# Patient Record
Sex: FEMALE | Race: WHITE | NOT HISPANIC OR LATINO | Employment: FULL TIME | URBAN - METROPOLITAN AREA
[De-identification: names, ages, dates, MRNs, and addresses within clinical notes are randomized per-mention and may not be internally consistent; named-entity substitution may affect disease eponyms.]

---

## 2017-01-12 ENCOUNTER — ROUTINE PRENATAL (OUTPATIENT)
Dept: OBSTETRICS AND GYNECOLOGY | Facility: CLINIC | Age: 37
End: 2017-01-12
Payer: COMMERCIAL

## 2017-01-12 VITALS
WEIGHT: 107.81 LBS | BODY MASS INDEX: 19.72 KG/M2 | SYSTOLIC BLOOD PRESSURE: 102 MMHG | DIASTOLIC BLOOD PRESSURE: 60 MMHG

## 2017-01-12 DIAGNOSIS — N91.2 AMENORRHEA: Primary | ICD-10-CM

## 2017-01-12 DIAGNOSIS — Z36.3 ENCOUNTER FOR ROUTINE SCREENING FOR MALFORMATION USING ULTRASONICS: ICD-10-CM

## 2017-01-12 PROCEDURE — 99999 PR PBB SHADOW E&M-EST. PATIENT-LVL I: CPT | Mod: PBBFAC,,, | Performed by: OBSTETRICS & GYNECOLOGY

## 2017-01-12 PROCEDURE — 1159F MED LIST DOCD IN RCRD: CPT | Mod: S$GLB,,, | Performed by: OBSTETRICS & GYNECOLOGY

## 2017-01-12 PROCEDURE — 99213 OFFICE O/P EST LOW 20 MIN: CPT | Mod: S$GLB,,, | Performed by: OBSTETRICS & GYNECOLOGY

## 2017-01-13 NOTE — PROGRESS NOTES
36 y.o.   OB History      Para Term  AB TAB SAB Ectopic Multiple Living    3 2 2       2        Comlaining of: pt here for amenorrhea visit, kelsey by early us ,   Good symptoms, no bleeding  Prev section x 2    PMH neg  PSH c section  Allergies none      ROS:  GENERAL: No fever, chills, fatigability or weight loss.  SKIN: No rashes, itching or changes in color or texture of skin.  HEAD: No headaches or recent head trauma.  EYES: Visual acuity fine. No photophobia, ocular pain or diplopia.  EARS: Denies ear pain, discharge or vertigo.  NOSE: No loss of smell, no epistaxis or postnasal drip.  MOUTH & THROAT: No hoarseness or change in voice. No excessive gum bleeding.  NODES: Denies swollen glands.  CHEST: Denies DASILVA, cyanosis, wheezing, cough and sputum production.  CARDIOVASCULAR: Denies chest pain, PND, orthopnea or reduced exercise tolerance.  ABDOMEN: Appetite fine. No weight loss. Denies diarrhea, abdominal pain, hematemesis or blood in stool.  URINARY: No flank pain, dysuria or hematuria.  PERIPHERAL VASCULAR: No claudication or cyanosis.  MUSCULOSKELETAL: No joint stiffness or swelling. Denies back pain.  NEUROLOGIC: No history of seizures, paralysis, alteration of gait or coordination      PE:   Visit Vitals    /60    Wt 48.9 kg (107 lb 12.9 oz)    LMP 10/10/2016 (Exact Date)    BMI 19.72 kg/m2    head/neck normal  abd soft, no organomegaly,   Fht good  extr normal    ass pregnancy/amenorrhea  Plan, counselled 15 min on prenatal care, diet, exercise, vit etc.  Fu visit 4 weeks for anatomy scan and labs

## 2017-01-19 ENCOUNTER — TELEPHONE (OUTPATIENT)
Dept: OBSTETRICS AND GYNECOLOGY | Facility: CLINIC | Age: 37
End: 2017-01-19

## 2017-01-19 NOTE — TELEPHONE ENCOUNTER
----- Message from Hilda Ivey sent at 1/19/2017 12:17 PM CST -----  Contact: self/[phone]  Patient is calling to find out the sex of her baby.  Please advise

## 2017-02-09 ENCOUNTER — LAB VISIT (OUTPATIENT)
Dept: LAB | Facility: HOSPITAL | Age: 37
End: 2017-02-09
Attending: OBSTETRICS & GYNECOLOGY
Payer: COMMERCIAL

## 2017-02-09 ENCOUNTER — ROUTINE PRENATAL (OUTPATIENT)
Dept: OBSTETRICS AND GYNECOLOGY | Facility: CLINIC | Age: 37
End: 2017-02-09
Payer: COMMERCIAL

## 2017-02-09 VITALS
SYSTOLIC BLOOD PRESSURE: 104 MMHG | WEIGHT: 111.13 LBS | DIASTOLIC BLOOD PRESSURE: 68 MMHG | BODY MASS INDEX: 20.32 KG/M2

## 2017-02-09 DIAGNOSIS — N91.2 AMENORRHEA: Primary | ICD-10-CM

## 2017-02-09 DIAGNOSIS — Z36.89 ULTRASOUND GENDER CHECK OF FETUS FOR MATERNAL WELL BEING: ICD-10-CM

## 2017-02-09 DIAGNOSIS — N91.2 AMENORRHEA: ICD-10-CM

## 2017-02-09 LAB
ABO + RH BLD: NORMAL
BASOPHILS # BLD AUTO: 0.05 K/UL
BASOPHILS NFR BLD: 0.4 %
BLD GP AB SCN CELLS X3 SERPL QL: NORMAL
DIFFERENTIAL METHOD: ABNORMAL
EOSINOPHIL # BLD AUTO: 0.1 K/UL
EOSINOPHIL NFR BLD: 1.1 %
ERYTHROCYTE [DISTWIDTH] IN BLOOD BY AUTOMATED COUNT: 14.3 %
HCT VFR BLD AUTO: 35.3 %
HGB BLD-MCNC: 12.2 G/DL
LYMPHOCYTES # BLD AUTO: 2.1 K/UL
LYMPHOCYTES NFR BLD: 17.1 %
MCH RBC QN AUTO: 31.7 PG
MCHC RBC AUTO-ENTMCNC: 34.6 %
MCV RBC AUTO: 92 FL
MONOCYTES # BLD AUTO: 0.8 K/UL
MONOCYTES NFR BLD: 6.8 %
NEUTROPHILS # BLD AUTO: 8.9 K/UL
NEUTROPHILS NFR BLD: 73 %
PLATELET # BLD AUTO: 227 K/UL
PMV BLD AUTO: 11.1 FL
RBC # BLD AUTO: 3.85 M/UL
WBC # BLD AUTO: 12.21 K/UL

## 2017-02-09 PROCEDURE — 86703 HIV-1/HIV-2 1 RESULT ANTBDY: CPT

## 2017-02-09 PROCEDURE — 0502F SUBSEQUENT PRENATAL CARE: CPT | Mod: S$GLB,,, | Performed by: OBSTETRICS & GYNECOLOGY

## 2017-02-09 PROCEDURE — 86762 RUBELLA ANTIBODY: CPT

## 2017-02-09 PROCEDURE — 86592 SYPHILIS TEST NON-TREP QUAL: CPT

## 2017-02-09 PROCEDURE — 86901 BLOOD TYPING SEROLOGIC RH(D): CPT

## 2017-02-09 PROCEDURE — 36415 COLL VENOUS BLD VENIPUNCTURE: CPT

## 2017-02-09 PROCEDURE — 85025 COMPLETE CBC W/AUTO DIFF WBC: CPT

## 2017-02-09 PROCEDURE — 99999 PR PBB SHADOW E&M-EST. PATIENT-LVL II: CPT | Mod: PBBFAC,,, | Performed by: OBSTETRICS & GYNECOLOGY

## 2017-02-09 PROCEDURE — 81220 CFTR GENE COM VARIANTS: CPT

## 2017-02-09 PROCEDURE — 86900 BLOOD TYPING SEROLOGIC ABO: CPT

## 2017-02-09 PROCEDURE — 87340 HEPATITIS B SURFACE AG IA: CPT

## 2017-02-10 LAB
HBV SURFACE AG SERPL QL IA: NEGATIVE
HIV 1+2 AB+HIV1 P24 AG SERPL QL IA: NEGATIVE
RPR SER QL: NORMAL
RUBV IGG SER-ACNC: 63.8 IU/ML
RUBV IGG SER-IMP: REACTIVE

## 2017-02-13 LAB — CFTR MUT ANL BLD/T: NORMAL

## 2017-03-09 ENCOUNTER — ROUTINE PRENATAL (OUTPATIENT)
Dept: OBSTETRICS AND GYNECOLOGY | Facility: CLINIC | Age: 37
End: 2017-03-09
Payer: COMMERCIAL

## 2017-03-09 ENCOUNTER — OFFICE VISIT (OUTPATIENT)
Dept: OBSTETRICS AND GYNECOLOGY | Facility: CLINIC | Age: 37
End: 2017-03-09
Payer: COMMERCIAL

## 2017-03-09 VITALS
DIASTOLIC BLOOD PRESSURE: 62 MMHG | WEIGHT: 117.94 LBS | BODY MASS INDEX: 21.57 KG/M2 | SYSTOLIC BLOOD PRESSURE: 108 MMHG

## 2017-03-09 DIAGNOSIS — N91.2 AMENORRHEA: Primary | ICD-10-CM

## 2017-03-09 DIAGNOSIS — Z36.89 ULTRASOUND GENDER CHECK OF FETUS FOR MATERNAL WELL BEING: ICD-10-CM

## 2017-03-09 DIAGNOSIS — Z36.3 ANTENATAL SCREENING FOR MALFORMATION USING ULTRASONICS: Primary | ICD-10-CM

## 2017-03-09 PROCEDURE — 99999 PR PBB SHADOW E&M-EST. PATIENT-LVL II: CPT | Mod: PBBFAC,,, | Performed by: OBSTETRICS & GYNECOLOGY

## 2017-03-09 PROCEDURE — 0502F SUBSEQUENT PRENATAL CARE: CPT | Mod: S$GLB,,, | Performed by: OBSTETRICS & GYNECOLOGY

## 2017-03-09 PROCEDURE — 76805 OB US >/= 14 WKS SNGL FETUS: CPT | Mod: S$GLB,,, | Performed by: OBSTETRICS & GYNECOLOGY

## 2017-04-06 ENCOUNTER — ROUTINE PRENATAL (OUTPATIENT)
Dept: OBSTETRICS AND GYNECOLOGY | Facility: CLINIC | Age: 37
End: 2017-04-06
Payer: COMMERCIAL

## 2017-04-06 VITALS
SYSTOLIC BLOOD PRESSURE: 100 MMHG | DIASTOLIC BLOOD PRESSURE: 66 MMHG | BODY MASS INDEX: 23.15 KG/M2 | WEIGHT: 126.56 LBS

## 2017-04-06 DIAGNOSIS — N91.2 AMENORRHEA: Primary | ICD-10-CM

## 2017-04-06 PROCEDURE — 0502F SUBSEQUENT PRENATAL CARE: CPT | Mod: S$GLB,,, | Performed by: OBSTETRICS & GYNECOLOGY

## 2017-04-06 PROCEDURE — 99999 PR PBB SHADOW E&M-EST. PATIENT-LVL I: CPT | Mod: PBBFAC,,, | Performed by: OBSTETRICS & GYNECOLOGY

## 2017-05-12 ENCOUNTER — ROUTINE PRENATAL (OUTPATIENT)
Dept: OBSTETRICS AND GYNECOLOGY | Facility: CLINIC | Age: 37
End: 2017-05-12
Payer: COMMERCIAL

## 2017-05-12 ENCOUNTER — LAB VISIT (OUTPATIENT)
Dept: LAB | Facility: HOSPITAL | Age: 37
End: 2017-05-12
Attending: OBSTETRICS & GYNECOLOGY
Payer: COMMERCIAL

## 2017-05-12 VITALS
DIASTOLIC BLOOD PRESSURE: 80 MMHG | BODY MASS INDEX: 24.31 KG/M2 | WEIGHT: 132.94 LBS | SYSTOLIC BLOOD PRESSURE: 116 MMHG

## 2017-05-12 DIAGNOSIS — N91.2 AMENORRHEA: Primary | ICD-10-CM

## 2017-05-12 DIAGNOSIS — N91.2 AMENORRHEA: ICD-10-CM

## 2017-05-12 LAB — GLUCOSE SERPL-MCNC: 135 MG/DL

## 2017-05-12 PROCEDURE — 82950 GLUCOSE TEST: CPT

## 2017-05-12 PROCEDURE — 0502F SUBSEQUENT PRENATAL CARE: CPT | Mod: S$GLB,,, | Performed by: OBSTETRICS & GYNECOLOGY

## 2017-05-12 PROCEDURE — 36415 COLL VENOUS BLD VENIPUNCTURE: CPT

## 2017-05-12 PROCEDURE — 99999 PR PBB SHADOW E&M-EST. PATIENT-LVL II: CPT | Mod: PBBFAC,,, | Performed by: OBSTETRICS & GYNECOLOGY

## 2017-05-23 ENCOUNTER — TELEPHONE (OUTPATIENT)
Dept: OBSTETRICS AND GYNECOLOGY | Facility: CLINIC | Age: 37
End: 2017-05-23

## 2017-05-23 NOTE — TELEPHONE ENCOUNTER
Patient was contacted and notified that her leave paperwork was ready for pickup and is located behind the  in a red folder. Verbalized understanding.

## 2017-06-01 ENCOUNTER — ROUTINE PRENATAL (OUTPATIENT)
Dept: OBSTETRICS AND GYNECOLOGY | Facility: CLINIC | Age: 37
End: 2017-06-01
Payer: COMMERCIAL

## 2017-06-01 DIAGNOSIS — Z34.90 PREGNANCY, UNSPECIFIED GESTATIONAL AGE: Primary | ICD-10-CM

## 2017-06-01 PROCEDURE — 0502F SUBSEQUENT PRENATAL CARE: CPT | Mod: S$GLB,,, | Performed by: OBSTETRICS & GYNECOLOGY

## 2017-06-16 ENCOUNTER — ROUTINE PRENATAL (OUTPATIENT)
Dept: OBSTETRICS AND GYNECOLOGY | Facility: CLINIC | Age: 37
End: 2017-06-16
Payer: COMMERCIAL

## 2017-06-16 ENCOUNTER — OFFICE VISIT (OUTPATIENT)
Dept: OBSTETRICS AND GYNECOLOGY | Facility: CLINIC | Age: 37
End: 2017-06-16
Payer: COMMERCIAL

## 2017-06-16 VITALS
SYSTOLIC BLOOD PRESSURE: 102 MMHG | DIASTOLIC BLOOD PRESSURE: 68 MMHG | BODY MASS INDEX: 25.32 KG/M2 | WEIGHT: 138.44 LBS

## 2017-06-16 DIAGNOSIS — Z34.90 PREGNANCY, UNSPECIFIED GESTATIONAL AGE: ICD-10-CM

## 2017-06-16 DIAGNOSIS — Z36.89 ENCOUNTER FOR ULTRASOUND TO CHECK FETAL GROWTH: Primary | ICD-10-CM

## 2017-06-16 DIAGNOSIS — N91.2 AMENORRHEA: Primary | ICD-10-CM

## 2017-06-16 DIAGNOSIS — Z36.85 ANTENATAL SCREENING FOR STREPTOCOCCUS B: ICD-10-CM

## 2017-06-16 PROCEDURE — 76816 OB US FOLLOW-UP PER FETUS: CPT | Mod: S$GLB,,, | Performed by: OBSTETRICS & GYNECOLOGY

## 2017-06-16 PROCEDURE — 99999 PR PBB SHADOW E&M-EST. PATIENT-LVL II: CPT | Mod: PBBFAC,,, | Performed by: OBSTETRICS & GYNECOLOGY

## 2017-06-16 PROCEDURE — 87081 CULTURE SCREEN ONLY: CPT

## 2017-06-16 PROCEDURE — 0502F SUBSEQUENT PRENATAL CARE: CPT | Mod: S$GLB,,, | Performed by: OBSTETRICS & GYNECOLOGY

## 2017-06-19 LAB — BACTERIA SPEC AEROBE CULT: NORMAL

## 2017-06-23 ENCOUNTER — ROUTINE PRENATAL (OUTPATIENT)
Dept: OBSTETRICS AND GYNECOLOGY | Facility: CLINIC | Age: 37
End: 2017-06-23
Payer: COMMERCIAL

## 2017-06-23 VITALS — SYSTOLIC BLOOD PRESSURE: 116 MMHG | WEIGHT: 137.81 LBS | DIASTOLIC BLOOD PRESSURE: 60 MMHG | BODY MASS INDEX: 25.2 KG/M2

## 2017-06-23 DIAGNOSIS — N91.2 AMENORRHEA: Primary | ICD-10-CM

## 2017-06-23 PROCEDURE — 99999 PR PBB SHADOW E&M-EST. PATIENT-LVL II: CPT | Mod: PBBFAC,,, | Performed by: OBSTETRICS & GYNECOLOGY

## 2017-06-23 PROCEDURE — 0502F SUBSEQUENT PRENATAL CARE: CPT | Mod: S$GLB,,, | Performed by: OBSTETRICS & GYNECOLOGY

## 2017-06-30 ENCOUNTER — ROUTINE PRENATAL (OUTPATIENT)
Dept: OBSTETRICS AND GYNECOLOGY | Facility: CLINIC | Age: 37
End: 2017-06-30
Payer: COMMERCIAL

## 2017-06-30 VITALS
DIASTOLIC BLOOD PRESSURE: 78 MMHG | SYSTOLIC BLOOD PRESSURE: 122 MMHG | BODY MASS INDEX: 25.48 KG/M2 | WEIGHT: 139.31 LBS

## 2017-06-30 DIAGNOSIS — N91.2 AMENORRHEA: Primary | ICD-10-CM

## 2017-06-30 PROCEDURE — 0502F SUBSEQUENT PRENATAL CARE: CPT | Mod: S$GLB,,, | Performed by: OBSTETRICS & GYNECOLOGY

## 2017-06-30 PROCEDURE — 99999 PR PBB SHADOW E&M-EST. PATIENT-LVL II: CPT | Mod: PBBFAC,,, | Performed by: OBSTETRICS & GYNECOLOGY

## 2017-06-30 NOTE — PROGRESS NOTES
Baby actvie, fht good, uterus soft  \bedsids us done, late 2 early 3 placenta, ama  Rpt section next thursday

## 2017-07-05 DIAGNOSIS — Z34.90 TERM PREGNANCY: ICD-10-CM

## 2017-07-05 RX ORDER — FAMOTIDINE 10 MG/ML
20 INJECTION INTRAVENOUS
Status: CANCELLED | OUTPATIENT
Start: 2017-07-05

## 2017-07-05 RX ORDER — SODIUM CITRATE AND CITRIC ACID MONOHYDRATE 334; 500 MG/5ML; MG/5ML
30 SOLUTION ORAL
Status: CANCELLED | OUTPATIENT
Start: 2017-07-05

## 2017-07-05 RX ORDER — SODIUM CHLORIDE, SODIUM LACTATE, POTASSIUM CHLORIDE, CALCIUM CHLORIDE 600; 310; 30; 20 MG/100ML; MG/100ML; MG/100ML; MG/100ML
INJECTION, SOLUTION INTRAVENOUS CONTINUOUS
Status: CANCELLED | OUTPATIENT
Start: 2017-07-05

## 2017-07-06 ENCOUNTER — SURGERY (OUTPATIENT)
Age: 37
End: 2017-07-06

## 2017-07-06 ENCOUNTER — ANESTHESIA EVENT (OUTPATIENT)
Dept: OBSTETRICS AND GYNECOLOGY | Facility: HOSPITAL | Age: 37
End: 2017-07-06
Payer: COMMERCIAL

## 2017-07-06 ENCOUNTER — ANESTHESIA (OUTPATIENT)
Dept: OBSTETRICS AND GYNECOLOGY | Facility: HOSPITAL | Age: 37
End: 2017-07-06
Payer: COMMERCIAL

## 2017-07-06 ENCOUNTER — HOSPITAL ENCOUNTER (INPATIENT)
Facility: HOSPITAL | Age: 37
LOS: 2 days | Discharge: HOME | End: 2017-07-08
Attending: OBSTETRICS & GYNECOLOGY | Admitting: OBSTETRICS & GYNECOLOGY
Payer: COMMERCIAL

## 2017-07-06 DIAGNOSIS — Z34.90 TERM PREGNANCY: ICD-10-CM

## 2017-07-06 LAB
ABO + RH BLD: NORMAL
BASOPHILS NFR BLD: 0 %
BLD GP AB SCN CELLS X3 SERPL QL: NORMAL
DIFFERENTIAL METHOD: NORMAL
EOSINOPHIL NFR BLD: 1 %
ERYTHROCYTE [DISTWIDTH] IN BLOOD BY AUTOMATED COUNT: 14.3 %
HCT VFR BLD AUTO: 37.1 %
HGB BLD-MCNC: 12.2 G/DL
LYMPHOCYTES NFR BLD: 35 %
MCH RBC QN AUTO: 30.1 PG
MCHC RBC AUTO-ENTMCNC: 32.9 %
MCV RBC AUTO: 92 FL
MONOCYTES NFR BLD: 6 %
NEUTROPHILS NFR BLD: 55 %
NEUTS BAND NFR BLD MANUAL: 3 %
PLATELET # BLD AUTO: 170 K/UL
PLATELET BLD QL SMEAR: NORMAL
PMV BLD AUTO: 11.7 FL
RBC # BLD AUTO: 4.05 M/UL
RPR SER QL: NORMAL
WBC # BLD AUTO: 9.56 K/UL

## 2017-07-06 PROCEDURE — 11000001 HC ACUTE MED/SURG PRIVATE ROOM

## 2017-07-06 PROCEDURE — 25000003 PHARM REV CODE 250: Performed by: ANESTHESIOLOGY

## 2017-07-06 PROCEDURE — 27201121 HC TRAY,SPINAL-HYPERBARIC: Performed by: ANESTHESIOLOGY

## 2017-07-06 PROCEDURE — 94761 N-INVAS EAR/PLS OXIMETRY MLT: CPT

## 2017-07-06 PROCEDURE — 86592 SYPHILIS TEST NON-TREP QUAL: CPT

## 2017-07-06 PROCEDURE — 36000685 HC CESAREAN SECTION LEVEL I

## 2017-07-06 PROCEDURE — 25000003 PHARM REV CODE 250: Performed by: STUDENT IN AN ORGANIZED HEALTH CARE EDUCATION/TRAINING PROGRAM

## 2017-07-06 PROCEDURE — 63600175 PHARM REV CODE 636 W HCPCS: Performed by: STUDENT IN AN ORGANIZED HEALTH CARE EDUCATION/TRAINING PROGRAM

## 2017-07-06 PROCEDURE — 25000003 PHARM REV CODE 250: Performed by: OBSTETRICS & GYNECOLOGY

## 2017-07-06 PROCEDURE — 85007 BL SMEAR W/DIFF WBC COUNT: CPT

## 2017-07-06 PROCEDURE — 37000008 HC ANESTHESIA 1ST 15 MINUTES: Performed by: OBSTETRICS & GYNECOLOGY

## 2017-07-06 PROCEDURE — 63600175 PHARM REV CODE 636 W HCPCS: Performed by: ANESTHESIOLOGY

## 2017-07-06 PROCEDURE — 59510 CESAREAN DELIVERY: CPT | Mod: AT,,, | Performed by: OBSTETRICS & GYNECOLOGY

## 2017-07-06 PROCEDURE — 85027 COMPLETE CBC AUTOMATED: CPT

## 2017-07-06 PROCEDURE — 86900 BLOOD TYPING SEROLOGIC ABO: CPT

## 2017-07-06 PROCEDURE — 86850 RBC ANTIBODY SCREEN: CPT

## 2017-07-06 PROCEDURE — 51702 INSERT TEMP BLADDER CATH: CPT

## 2017-07-06 PROCEDURE — 37000009 HC ANESTHESIA EA ADD 15 MINS: Performed by: OBSTETRICS & GYNECOLOGY

## 2017-07-06 RX ORDER — SODIUM CITRATE AND CITRIC ACID MONOHYDRATE 334; 500 MG/5ML; MG/5ML
30 SOLUTION ORAL
Status: DISCONTINUED | OUTPATIENT
Start: 2017-07-06 | End: 2017-07-08 | Stop reason: HOSPADM

## 2017-07-06 RX ORDER — DIPHENHYDRAMINE HYDROCHLORIDE 50 MG/ML
25 INJECTION INTRAMUSCULAR; INTRAVENOUS EVERY 4 HOURS PRN
Status: DISCONTINUED | OUTPATIENT
Start: 2017-07-06 | End: 2017-07-08 | Stop reason: HOSPADM

## 2017-07-06 RX ORDER — BUPIVACAINE HYDROCHLORIDE 2.5 MG/ML
30 INJECTION, SOLUTION EPIDURAL; INFILTRATION; INTRACAUDAL ONCE
Status: DISCONTINUED | OUTPATIENT
Start: 2017-07-06 | End: 2017-07-08 | Stop reason: HOSPADM

## 2017-07-06 RX ORDER — SODIUM CHLORIDE, SODIUM LACTATE, POTASSIUM CHLORIDE, CALCIUM CHLORIDE 600; 310; 30; 20 MG/100ML; MG/100ML; MG/100ML; MG/100ML
INJECTION, SOLUTION INTRAVENOUS CONTINUOUS
Status: ACTIVE | OUTPATIENT
Start: 2017-07-06 | End: 2017-07-06

## 2017-07-06 RX ORDER — FAMOTIDINE 10 MG/ML
20 INJECTION INTRAVENOUS
Status: DISCONTINUED | OUTPATIENT
Start: 2017-07-06 | End: 2017-07-08 | Stop reason: HOSPADM

## 2017-07-06 RX ORDER — OXYCODONE AND ACETAMINOPHEN 5; 325 MG/1; MG/1
1 TABLET ORAL EVERY 4 HOURS PRN
Status: DISCONTINUED | OUTPATIENT
Start: 2017-07-06 | End: 2017-07-08 | Stop reason: HOSPADM

## 2017-07-06 RX ORDER — DIPHENHYDRAMINE HCL 25 MG
25 CAPSULE ORAL EVERY 4 HOURS PRN
Status: DISCONTINUED | OUTPATIENT
Start: 2017-07-06 | End: 2017-07-08 | Stop reason: HOSPADM

## 2017-07-06 RX ORDER — ONDANSETRON 8 MG/1
8 TABLET, ORALLY DISINTEGRATING ORAL EVERY 8 HOURS PRN
Status: DISCONTINUED | OUTPATIENT
Start: 2017-07-06 | End: 2017-07-08 | Stop reason: HOSPADM

## 2017-07-06 RX ORDER — ONDANSETRON 2 MG/ML
4 INJECTION INTRAMUSCULAR; INTRAVENOUS EVERY 12 HOURS PRN
Status: DISCONTINUED | OUTPATIENT
Start: 2017-07-06 | End: 2017-07-08 | Stop reason: HOSPADM

## 2017-07-06 RX ORDER — ADHESIVE BANDAGE
30 BANDAGE TOPICAL 2 TIMES DAILY PRN
Status: DISCONTINUED | OUTPATIENT
Start: 2017-07-07 | End: 2017-07-08 | Stop reason: HOSPADM

## 2017-07-06 RX ORDER — PROPOFOL 10 MG/ML
VIAL (ML) INTRAVENOUS
Status: DISCONTINUED | OUTPATIENT
Start: 2017-07-06 | End: 2017-07-06

## 2017-07-06 RX ORDER — NAPROXEN 250 MG/1
375 TABLET ORAL 2 TIMES DAILY WITH MEALS
Status: DISCONTINUED | OUTPATIENT
Start: 2017-07-06 | End: 2017-07-06 | Stop reason: CLARIF

## 2017-07-06 RX ORDER — OXYTOCIN/RINGER'S LACTATE 20/1000 ML
41.65 PLASTIC BAG, INJECTION (ML) INTRAVENOUS CONTINUOUS
Status: ACTIVE | OUTPATIENT
Start: 2017-07-06 | End: 2017-07-06

## 2017-07-06 RX ORDER — NAPROXEN 500 MG/1
500 TABLET ORAL 2 TIMES DAILY PRN
Status: DISCONTINUED | OUTPATIENT
Start: 2017-07-06 | End: 2017-07-08 | Stop reason: HOSPADM

## 2017-07-06 RX ORDER — AMOXICILLIN 250 MG
1 CAPSULE ORAL NIGHTLY PRN
Status: DISCONTINUED | OUTPATIENT
Start: 2017-07-06 | End: 2017-07-08 | Stop reason: HOSPADM

## 2017-07-06 RX ORDER — SODIUM CHLORIDE, SODIUM LACTATE, POTASSIUM CHLORIDE, CALCIUM CHLORIDE 600; 310; 30; 20 MG/100ML; MG/100ML; MG/100ML; MG/100ML
INJECTION, SOLUTION INTRAVENOUS CONTINUOUS PRN
Status: DISCONTINUED | OUTPATIENT
Start: 2017-07-06 | End: 2017-07-06

## 2017-07-06 RX ORDER — MORPHINE SULFATE 2 MG/ML
2 INJECTION, SOLUTION INTRAMUSCULAR; INTRAVENOUS
Status: ACTIVE | OUTPATIENT
Start: 2017-07-06 | End: 2017-07-07

## 2017-07-06 RX ORDER — ACETAMINOPHEN 325 MG/1
650 TABLET ORAL EVERY 6 HOURS PRN
Status: DISCONTINUED | OUTPATIENT
Start: 2017-07-06 | End: 2017-07-08 | Stop reason: HOSPADM

## 2017-07-06 RX ORDER — BISACODYL 10 MG
10 SUPPOSITORY, RECTAL RECTAL ONCE AS NEEDED
Status: ACTIVE | OUTPATIENT
Start: 2017-07-06 | End: 2017-07-06

## 2017-07-06 RX ORDER — METOCLOPRAMIDE HYDROCHLORIDE 5 MG/ML
10 INJECTION INTRAMUSCULAR; INTRAVENOUS ONCE
Status: CANCELLED | OUTPATIENT
Start: 2017-07-06 | End: 2017-07-06

## 2017-07-06 RX ORDER — NALOXONE HCL 0.4 MG/ML
0.04 VIAL (ML) INJECTION
Status: DISCONTINUED | OUTPATIENT
Start: 2017-07-06 | End: 2017-07-08 | Stop reason: HOSPADM

## 2017-07-06 RX ORDER — MIDAZOLAM HYDROCHLORIDE 1 MG/ML
INJECTION INTRAMUSCULAR; INTRAVENOUS
Status: DISCONTINUED | OUTPATIENT
Start: 2017-07-06 | End: 2017-07-06

## 2017-07-06 RX ORDER — SIMETHICONE 80 MG
1 TABLET,CHEWABLE ORAL EVERY 6 HOURS PRN
Status: DISCONTINUED | OUTPATIENT
Start: 2017-07-06 | End: 2017-07-08 | Stop reason: HOSPADM

## 2017-07-06 RX ORDER — SODIUM CITRATE AND CITRIC ACID MONOHYDRATE 334; 500 MG/5ML; MG/5ML
30 SOLUTION ORAL ONCE
Status: CANCELLED | OUTPATIENT
Start: 2017-07-06 | End: 2017-07-06

## 2017-07-06 RX ORDER — OXYCODONE AND ACETAMINOPHEN 10; 325 MG/1; MG/1
1 TABLET ORAL EVERY 4 HOURS PRN
Status: DISCONTINUED | OUTPATIENT
Start: 2017-07-06 | End: 2017-07-08 | Stop reason: HOSPADM

## 2017-07-06 RX ORDER — FAMOTIDINE 10 MG/ML
20 INJECTION INTRAVENOUS ONCE
Status: CANCELLED | OUTPATIENT
Start: 2017-07-06 | End: 2017-07-06

## 2017-07-06 RX ORDER — SODIUM CHLORIDE, SODIUM LACTATE, POTASSIUM CHLORIDE, CALCIUM CHLORIDE 600; 310; 30; 20 MG/100ML; MG/100ML; MG/100ML; MG/100ML
INJECTION, SOLUTION INTRAVENOUS CONTINUOUS
Status: DISCONTINUED | OUTPATIENT
Start: 2017-07-06 | End: 2017-07-08 | Stop reason: HOSPADM

## 2017-07-06 RX ADMIN — MORPHINE SULFATE 0.2 MG: 1 INJECTION, SOLUTION EPIDURAL; INTRATHECAL; INTRAVENOUS at 08:07

## 2017-07-06 RX ADMIN — OXYCODONE HYDROCHLORIDE AND ACETAMINOPHEN 1 TABLET: 10; 325 TABLET ORAL at 08:07

## 2017-07-06 RX ADMIN — SODIUM CHLORIDE, SODIUM LACTATE, POTASSIUM CHLORIDE, AND CALCIUM CHLORIDE: .6; .31; .03; .02 INJECTION, SOLUTION INTRAVENOUS at 07:07

## 2017-07-06 RX ADMIN — PROPOFOL 30 MG: 10 INJECTION, EMULSION INTRAVENOUS at 08:07

## 2017-07-06 RX ADMIN — FAMOTIDINE 20 MG: 10 INJECTION, SOLUTION INTRAVENOUS at 06:07

## 2017-07-06 RX ADMIN — SODIUM CHLORIDE, SODIUM LACTATE, POTASSIUM CHLORIDE, AND CALCIUM CHLORIDE: .6; .31; .03; .02 INJECTION, SOLUTION INTRAVENOUS at 08:07

## 2017-07-06 RX ADMIN — NAPROXEN 500 MG: 500 TABLET ORAL at 08:07

## 2017-07-06 RX ADMIN — ONDANSETRON 4 MG: 2 INJECTION, SOLUTION INTRAMUSCULAR; INTRAVENOUS at 08:07

## 2017-07-06 RX ADMIN — PROPOFOL 20 MG: 10 INJECTION, EMULSION INTRAVENOUS at 08:07

## 2017-07-06 RX ADMIN — Medication 30 UNITS: at 09:07

## 2017-07-06 RX ADMIN — MIDAZOLAM HYDROCHLORIDE 2 MG: 1 INJECTION, SOLUTION INTRAMUSCULAR; INTRAVENOUS at 08:07

## 2017-07-06 RX ADMIN — DEXTROSE 2 G: 50 INJECTION, SOLUTION INTRAVENOUS at 08:07

## 2017-07-06 RX ADMIN — SODIUM CITRATE AND CITRIC ACID MONOHYDRATE 30 ML: 500; 334 SOLUTION ORAL at 06:07

## 2017-07-06 RX ADMIN — SODIUM CHLORIDE, SODIUM LACTATE, POTASSIUM CHLORIDE, AND CALCIUM CHLORIDE 1000 ML: .6; .31; .03; .02 INJECTION, SOLUTION INTRAVENOUS at 06:07

## 2017-07-06 RX ADMIN — FENTANYL CITRATE 10 MCG: 50 INJECTION, SOLUTION INTRAMUSCULAR; INTRAVENOUS at 08:07

## 2017-07-06 NOTE — ANESTHESIA PREPROCEDURE EVALUATION
07/06/2017  India Chopra is a 37 y.o., female.    Pre-op Assessment    I have reviewed the Patient Summary Reports.     I have reviewed the Nursing Notes.   I have reviewed the Medications.     Review of Systems  Anesthesia Hx:  No problems with previous Anesthesia States that her first spinal may have been high. Had SOB. Patient panicked. No intubation required History of prior surgery of interest to airway management or planning: Previous anesthesia: Spinal  Denies Personal Hx of Anesthesia complications.   Social:  Non-Smoker, No Alcohol Use    Cardiovascular:   Exercise tolerance: good    Pulmonary:  Pulmonary Normal    Renal/:  Renal/ Normal     Hepatic/GI:  Hepatic/GI Normal    Neurological:  Neurology Normal    Endocrine:  Endocrine Normal    Psych:   anxiety Anxiety/Panic Attacks: uses xanax during an attack         Physical Exam  General:  Well nourished    Airway/Jaw/Neck:  Airway Findings: Mouth Opening: Normal Tongue: Normal  General Airway Assessment: Adult  Mallampati: II  TM Distance: Normal, at least 6 cm      Dental:  Dental Findings: In tact   Chest/Lungs:  Chest/Lungs Findings: Clear to auscultation, Normal Respiratory Rate     Heart/Vascular:  Heart Findings: Rate: Normal  Sounds: Normal         labs pending; nml throughout pregnancy with no s/s of bleeding d/o  Lab Results   Component Value Date    WBC 9.56 07/06/2017    HGB 12.2 07/06/2017    HCT 37.1 07/06/2017     07/06/2017    TSH 1.09 12/18/2012         Anesthesia Plan  Type of Anesthesia, risks & benefits discussed:  Anesthesia Type:  spinal  Patient's Preference:   Intra-op Monitoring Plan:   Intra-op Monitoring Plan Comments:   Post Op Pain Control Plan:   Post Op Pain Control Plan Comments:   Induction:    Beta Blocker:         Informed Consent: Patient understands risks and agrees with Anesthesia plan.  Questions  answered. Anesthesia consent signed with patient.  ASA Score: 2     Day of Surgery Review of History & Physical: I have interviewed and examined the patient. I have reviewed the patient's H&P dated:            Ready For Surgery From Anesthesia Perspective.

## 2017-07-06 NOTE — PLAN OF CARE
Problem: Patient Care Overview  Goal: Plan of Care Review  Pt on RA SPO2 100%.  No apparent distress.  Will continue to monitor.

## 2017-07-06 NOTE — BRIEF OP NOTE
transcriptiion service not availabel at this time  Repeat low trans c section, clear fluid, healthy male, 2 grms ancef  No complictions, ebl 300cc,   mwiedemann July 6  rojas assissting  Counts correct

## 2017-07-07 LAB
BASOPHILS # BLD AUTO: 0.03 K/UL
BASOPHILS NFR BLD: 0.2 %
DIFFERENTIAL METHOD: ABNORMAL
EOSINOPHIL # BLD AUTO: 0.1 K/UL
EOSINOPHIL NFR BLD: 1.1 %
ERYTHROCYTE [DISTWIDTH] IN BLOOD BY AUTOMATED COUNT: 14.2 %
HCT VFR BLD AUTO: 28.2 %
HGB BLD-MCNC: 9.4 G/DL
LYMPHOCYTES # BLD AUTO: 1.5 K/UL
LYMPHOCYTES NFR BLD: 11.6 %
MCH RBC QN AUTO: 30.2 PG
MCHC RBC AUTO-ENTMCNC: 33.3 %
MCV RBC AUTO: 91 FL
MONOCYTES # BLD AUTO: 1.2 K/UL
MONOCYTES NFR BLD: 9.2 %
NEUTROPHILS # BLD AUTO: 10.1 K/UL
NEUTROPHILS NFR BLD: 77.3 %
PLATELET # BLD AUTO: 140 K/UL
PMV BLD AUTO: 12.2 FL
RBC # BLD AUTO: 3.11 M/UL
WBC # BLD AUTO: 13.07 K/UL

## 2017-07-07 PROCEDURE — 36415 COLL VENOUS BLD VENIPUNCTURE: CPT

## 2017-07-07 PROCEDURE — 85025 COMPLETE CBC W/AUTO DIFF WBC: CPT

## 2017-07-07 PROCEDURE — 99024 POSTOP FOLLOW-UP VISIT: CPT | Mod: ,,, | Performed by: OBSTETRICS & GYNECOLOGY

## 2017-07-07 PROCEDURE — 11000001 HC ACUTE MED/SURG PRIVATE ROOM

## 2017-07-07 PROCEDURE — 25000003 PHARM REV CODE 250: Performed by: OBSTETRICS & GYNECOLOGY

## 2017-07-07 RX ORDER — FENTANYL CITRATE 50 UG/ML
INJECTION, SOLUTION INTRAMUSCULAR; INTRAVENOUS
Status: DISCONTINUED | OUTPATIENT
Start: 2017-07-06 | End: 2017-07-07

## 2017-07-07 RX ORDER — OXYTOCIN/RINGER'S LACTATE 20/1000 ML
PLASTIC BAG, INJECTION (ML) INTRAVENOUS
Status: DISCONTINUED | OUTPATIENT
Start: 2017-07-06 | End: 2017-07-07

## 2017-07-07 RX ORDER — MORPHINE SULFATE 1 MG/ML
INJECTION, SOLUTION EPIDURAL; INTRATHECAL; INTRAVENOUS
Status: DISCONTINUED | OUTPATIENT
Start: 2017-07-06 | End: 2017-07-07

## 2017-07-07 RX ADMIN — ACETAMINOPHEN 650 MG: 325 TABLET ORAL at 06:07

## 2017-07-07 RX ADMIN — OXYCODONE AND ACETAMINOPHEN 1 TABLET: 5; 325 TABLET ORAL at 10:07

## 2017-07-07 RX ADMIN — SIMETHICONE CHEW TAB 80 MG 80 MG: 80 TABLET ORAL at 10:07

## 2017-07-07 RX ADMIN — OXYCODONE HYDROCHLORIDE AND ACETAMINOPHEN 1 TABLET: 10; 325 TABLET ORAL at 06:07

## 2017-07-07 RX ADMIN — NAPROXEN 500 MG: 500 TABLET ORAL at 10:07

## 2017-07-07 RX ADMIN — MAGNESIUM HYDROXIDE 2400 MG: 400 SUSPENSION ORAL at 04:07

## 2017-07-07 RX ADMIN — OXYCODONE AND ACETAMINOPHEN 1 TABLET: 5; 325 TABLET ORAL at 07:07

## 2017-07-07 RX ADMIN — STANDARDIZED SENNA CONCENTRATE AND DOCUSATE SODIUM 1 TABLET: 8.6; 5 TABLET, FILM COATED ORAL at 10:07

## 2017-07-07 RX ADMIN — SIMETHICONE CHEW TAB 80 MG 80 MG: 80 TABLET ORAL at 04:07

## 2017-07-07 NOTE — ANESTHESIA PROCEDURE NOTES
Spinal    Diagnosis: Pregnancy,   Patient location during procedure: OR  Start time: 2017 8:21 AM  Timeout: 2017 8:20 AM  End time: 2017 8:30 AM  Staffing  Anesthesiologist: PREET FOSTER  Resident/CRNA: CECILIO VIRGEN  Performed: resident/CRNA   Preanesthetic Checklist  Completed: patient identified, site marked, surgical consent, pre-op evaluation, timeout performed, IV checked, risks and benefits discussed and monitors and equipment checked  Spinal Block  Patient position: sitting  Prep: ChloraPrep  Patient monitoring: heart rate, cardiac monitor and continuous pulse ox  Approach: midline  Location: L3-4  Injection technique: single shot  CSF Fluid: clear free-flowing CSF  Needle  Needle type: pencil-tip   Needle gauge: 25 G  Needle length: 3.5 in  Additional Documentation: negative aspiration for heme and no paresthesia on injection  Needle localization: anatomical landmarks  Assessment  Sensory level: T6   Dermatomal levels determined by alcohol wipe  Ease of block: easy  Patient's tolerance of the procedure: comfortable throughout block  Medications:  Bolus administered: 1.4 mL of 0.75 and with dextrose bupivacaine  Opioid administered: 0.2 mcg of   fentanyl and morphine (10 mcg)  Additional Notes  Performed aseptically by LINDSEY Virgen

## 2017-07-07 NOTE — ANESTHESIA POSTPROCEDURE EVALUATION
"Anesthesia Post Evaluation    Patient: India Chopra    Procedure(s) Performed: Procedure(s) (LRB):  DELIVERY-CEASAREAN SECTION (N/A)    Final Anesthesia Type: spinal  Patient location during evaluation: labor & delivery  Patient participation: Yes- Able to Participate  Level of consciousness: awake and alert and oriented  Post-procedure vital signs: reviewed and stable  Pain management: adequate  Airway patency: patent  PONV status at discharge: No PONV  Anesthetic complications: no      Cardiovascular status: blood pressure returned to baseline and hemodynamically stable  Respiratory status: unassisted, spontaneous ventilation and room air  Hydration status: euvolemic  Follow-up not needed.        Visit Vitals  /61 (BP Location: Right arm, Patient Position: Sitting, BP Method: Automatic)   Pulse 84   Temp 36.5 °C (97.7 °F) (Oral)   Resp 18   Ht 5' 2" (1.575 m)   Wt 63 kg (139 lb)   LMP 10/10/2016 (Exact Date)   SpO2 100%   Breastfeeding? Unknown   BMI 25.42 kg/m²       Pain/Ruiz Score: Pain Rating Prior to Med Admin: 5 (7/7/2017 10:11 AM)  Pain Rating Post Med Admin: 0 (7/7/2017  8:00 AM)    No catheter in back  No headache/neckache/backache  Full return of neurological function  Able to urinate  "

## 2017-07-07 NOTE — PROGRESS NOTES
"POD #1 s/p     Subjective: No complaints. Positive flatus    Objective: /61 (BP Location: Right arm, Patient Position: Sitting, BP Method: Automatic)   Pulse 84   Temp 97.7 °F (36.5 °C) (Oral)   Resp 18   Ht 5' 2" (1.575 m)   Wt 63 kg (139 lb)   LMP 10/10/2016 (Exact Date)   SpO2 100%   Breastfeeding? Unknown   BMI 25.42 kg/m²     H/H:   Lab Results   Component Value Date    WBC 13.07 (H) 2017    HGB 9.4 (L) 2017    HCT 28.2 (L) 2017    MCV 91 2017     (L) 2017       Chest: Clear to auscultation  CV: Regular rate and rhythm  Abdomen: Non-tender, non-distended, soft, positive bowel sounds  Incision: Bandaged  Extremities: Non-tender, no edema    Assessment: S/P     Plan: Routine progressive care      "

## 2017-07-08 VITALS
OXYGEN SATURATION: 99 % | RESPIRATION RATE: 18 BRPM | HEART RATE: 87 BPM | TEMPERATURE: 98 F | SYSTOLIC BLOOD PRESSURE: 92 MMHG | BODY MASS INDEX: 25.58 KG/M2 | WEIGHT: 139 LBS | HEIGHT: 62 IN | DIASTOLIC BLOOD PRESSURE: 54 MMHG

## 2017-07-08 PROCEDURE — 90471 IMMUNIZATION ADMIN: CPT | Performed by: OBSTETRICS & GYNECOLOGY

## 2017-07-08 PROCEDURE — 94761 N-INVAS EAR/PLS OXIMETRY MLT: CPT

## 2017-07-08 PROCEDURE — 25000003 PHARM REV CODE 250: Performed by: OBSTETRICS & GYNECOLOGY

## 2017-07-08 PROCEDURE — 63600175 PHARM REV CODE 636 W HCPCS: Performed by: OBSTETRICS & GYNECOLOGY

## 2017-07-08 PROCEDURE — 99024 POSTOP FOLLOW-UP VISIT: CPT | Mod: ,,, | Performed by: OBSTETRICS & GYNECOLOGY

## 2017-07-08 PROCEDURE — 3E0234Z INTRODUCTION OF SERUM, TOXOID AND VACCINE INTO MUSCLE, PERCUTANEOUS APPROACH: ICD-10-PCS | Performed by: OBSTETRICS & GYNECOLOGY

## 2017-07-08 PROCEDURE — 90715 TDAP VACCINE 7 YRS/> IM: CPT | Performed by: OBSTETRICS & GYNECOLOGY

## 2017-07-08 RX ADMIN — CLOSTRIDIUM TETANI TOXOID ANTIGEN (FORMALDEHYDE INACTIVATED), CORYNEBACTERIUM DIPHTHERIAE TOXOID ANTIGEN (FORMALDEHYDE INACTIVATED), BORDETELLA PERTUSSIS TOXOID ANTIGEN (GLUTARALDEHYDE INACTIVATED), BORDETELLA PERTUSSIS FILAMENTOUS HEMAGGLUTININ ANTIGEN (FORMALDEHYDE INACTIVATED), BORDETELLA PERTUSSIS PERTACTIN ANTIGEN, AND BORDETELLA PERTUSSIS FIMBRIAE 2/3 ANTIGEN 0.5 ML: 5; 2; 2.5; 5; 3; 5 INJECTION, SUSPENSION INTRAMUSCULAR at 12:07

## 2017-07-08 RX ADMIN — NAPROXEN 500 MG: 500 TABLET ORAL at 05:07

## 2017-07-08 NOTE — DISCHARGE SUMMARY
Admit Date: 17  Discharge Date: 17    Attending physician: Wiedemann , MD    Diagnosis:  Term Pregnancy  Viable male  Infant  Previous   AMA    Procedure:   Repeat       Hospital Course: Afebrile and vital signs stable throughout hospital course. Routine progressive care. Vaginal bleeding stable. Tolerating oral intake. Positive flatus.    Discharged Condition: Improving    Disposition: Discharged to home or self care    Activity:  As tolerated  Pelvic rest x 6 weeks  Diet: Regular  Medications: Given to patient or sent electronically   Follow up:  2  Weeks for   4-6 weeks for vaginal delivery

## 2017-07-08 NOTE — PLAN OF CARE
Discharge instructions given verbally and in writing.  Verbalized understanding.  Received Mother-Baby care guide during hospital stay and reviewed with pt prior to discharge.  Prescriptions given with explanation for use by in house pharmacy. Verbalized understanding.  CDC vaccine information statement given and risk/benifits of tdap vaccine discussed.  Tdap given per pt. request. States she feels comfortable taking care of baby and has demonstrated ability to care for  and herself.  Both parents demonstrated ability to care for circumcision site.  Says she will have assistance from  and father when she returns home.  Discharged to home in stable condition via w/c with infant in arms.

## 2017-07-08 NOTE — DISCHARGE INSTRUCTIONS
"Patient Discharge Instructions for Postpartum Women    Resume Regular Diet  Increase activity gradually, no heavy lifting  Shower  No tampons, douching or sexual intercourse.  Discuss birth control options with your physician.  Wear a support bra  Return to work/school when you've been cleared by a physician    Call your physician if     *Fever of 100.4 or higher  *Persistent nausea/ vomiting  *Incisional drainage  *Heavy vaginal bleeding or large clots (Heavy bleeding is soaking 1 pad in an hour)  *Swelling and pain in arms or legs  *Severe headaches, blurred vision or fainting  *Shortness of breath  *Frequency and burning with urination  *Signs of postpartum depression, discuss these signs with your physician    Call lactation services for questions regarding feeding, nipple and breast care, and general questions about lactation.  They can be reached at [phone]         Understanding Postpartum Depression    You've just had a baby.  You know you should be excited and happy.  But instead you find yourself crying for no reason.  You may have trouble coping with your daily tasks.  You feel sad, tired, and hopeless most of the time.  You may even feel ashamed or guilty.  But what you're going through is not your fault and you can feel better.  Talk to your doctor.  He or she can help.    Depression After Childbirth    You may be weepy and tired right after giving birth.  These feelings are normal.  They're sometimes called the "baby blues."  These blues go away 2-3 weeks.  However, postpartum (meaning "after birth") depression lasts much longer and is more sever than the "baby blues."  It can make you feel sad and hopeless.  You may also fear that your baby will be harmed and worry about being a bad mother.      What is Depression?    Depression is a mood disorder that affects the way you think and feel.  The most common symptom is a feeling of deep sadness.  You may also feel as if you just can't cope with life.  "   Other symptoms include:      * Gaining or loosing weight  * Sleeping too much or too little  * Feeling tired all the time  * Feeling restless  * Fears of harming your baby   * Lack of interest in your baby  * Feeling worthless or guilty  * No longer finding pleasure in things you used to  * Having trouble thinking clearly or making decisions  * Thoughts of hurting yourself or your baby    What Causes Postpartum Depression    The exact causes of postpartum depression isn't known.  It may be due to changes in your hormones during and after childbirth.  You may also be tired from caring for your baby and adjusting to being a mother.  All these factors may make you feel depressed.  In some cases, your genes may also play a role.    Depression Can Be Treated    The good news is that there are many ways to treat postpartum depression.  Talking to your doctor is the first step toward feeling better.    Resources:    * National [place] of Mental Health  -- 963.661.4551    www.nimh.nih.gov    * National [place] on Mental Illness --376.237.6565    Www.[place].org    * Mental Health [place] -- 703.973.7714     Www.[facility].org    * National Suicide Hotline --316.558.7111 (800-SUICIDE)    1744-6797 [employer]  All rights reserved.  This information is not intended as a substitute for professional medical care.  Always follow up with your healthcare professional's instructions.

## 2017-07-08 NOTE — NURSING
17 @ 1900 - Rec'd report off-going nurse on 38 y/o CF  S/P Repeat  17 @ 0840 M/C 7 lbs 11.3 oz.  2200 - Percocet 5 mg PO and Docusate 1 tablet PO.   0445 - C/O abdominal incision burning.   0533 - Naproxen 500 mg PO.   0645 - Report given to oncoming nurse.

## 2017-07-08 NOTE — PLAN OF CARE
Problem: Patient Care Overview  Goal: Plan of Care Review  Ambulating, showered, dressing removed.  Incision dry and intact.  Voiding and passing gas.  Reg diet tolerated.  Has chosen to formula feed and demonstrates ability to feed and care for baby.  Considering going home tomorrow.  States pain is well controled with pain medication.  Request Milk of magnesia for feeling of constipation and simethicone for gas.  States simethicone helpful.  Encouraged fluids and ambulation.  Verbalized understanding.

## 2017-07-09 NOTE — OP NOTE
Date July 6  Procedure rpt low trans section  Surgeon rebecca xie  Dx term pregnancy, prev section  Complications none  Procedure, spinal, prep, iv ab's, pfan inc through old scare, open in layers routine, clear fluid, healthy infant  Transverse inc, placenta in tact, uterus and abd closed in layers routine, ebl 400, urine clear, counts correct

## 2017-07-10 ENCOUNTER — TELEPHONE (OUTPATIENT)
Dept: OBSTETRICS AND GYNECOLOGY | Facility: CLINIC | Age: 37
End: 2017-07-10

## 2017-07-10 NOTE — TELEPHONE ENCOUNTER
----- Message from Dali Meyers sent at 7/10/2017  8:11 AM CDT -----  Contact: [phone]  Patient would like to know when she has to come in to have her staples removed

## 2017-07-13 ENCOUNTER — POSTPARTUM VISIT (OUTPATIENT)
Dept: OBSTETRICS AND GYNECOLOGY | Facility: CLINIC | Age: 37
End: 2017-07-13
Payer: COMMERCIAL

## 2017-07-13 VITALS
SYSTOLIC BLOOD PRESSURE: 110 MMHG | WEIGHT: 126.13 LBS | DIASTOLIC BLOOD PRESSURE: 68 MMHG | HEIGHT: 62 IN | BODY MASS INDEX: 23.21 KG/M2

## 2017-07-13 DIAGNOSIS — Z98.890 POST-OPERATIVE STATE: Primary | ICD-10-CM

## 2017-07-13 PROCEDURE — 99024 POSTOP FOLLOW-UP VISIT: CPT | Mod: S$GLB,,, | Performed by: OBSTETRICS & GYNECOLOGY

## 2017-07-13 PROCEDURE — 99999 PR PBB SHADOW E&M-EST. PATIENT-LVL II: CPT | Mod: PBBFAC,,, | Performed by: OBSTETRICS & GYNECOLOGY

## 2017-07-13 RX ORDER — OXYCODONE AND ACETAMINOPHEN 5; 325 MG/1; MG/1
TABLET ORAL
COMMUNITY
Start: 2017-07-06

## 2017-07-13 RX ORDER — NAPROXEN SODIUM 550 MG/1
TABLET ORAL
COMMUNITY
Start: 2017-07-06

## 2017-07-14 NOTE — PROGRESS NOTES
Post c secton xenia gutierrez  Staples removed  headling great  No co  Gi and gu good  [facility]  Bottle

## 2017-07-19 NOTE — TELEPHONE ENCOUNTER
----- Message from Tiff Love sent at 7/19/2017  2:47 PM CDT -----  No. [phone]  Patient had a section 2 weeks ago.  She has a yeast infection.   [facility] in [place]

## 2017-07-20 RX ORDER — FLUCONAZOLE 150 MG/1
150 TABLET ORAL DAILY
Qty: 1 TABLET | Refills: 1 | Status: SHIPPED | OUTPATIENT
Start: 2017-07-20 | End: 2017-07-21

## 2017-07-21 NOTE — ADDENDUM NOTE
Addendum  created 07/20/17 2140 by Man Virgen MD    Anesthesia Intra Blocks edited, Sign clinical note

## 2020-07-31 ENCOUNTER — OFFICE VISIT (OUTPATIENT)
Dept: OBSTETRICS AND GYNECOLOGY | Facility: CLINIC | Age: 40
End: 2020-07-31
Payer: COMMERCIAL

## 2020-07-31 VITALS
DIASTOLIC BLOOD PRESSURE: 76 MMHG | HEIGHT: 62 IN | SYSTOLIC BLOOD PRESSURE: 124 MMHG | WEIGHT: 113.88 LBS | BODY MASS INDEX: 20.96 KG/M2

## 2020-07-31 DIAGNOSIS — Z01.419 WELL WOMAN EXAM WITH ROUTINE GYNECOLOGICAL EXAM: ICD-10-CM

## 2020-07-31 DIAGNOSIS — Z12.4 ROUTINE PAPANICOLAOU SMEAR: Primary | ICD-10-CM

## 2020-07-31 PROCEDURE — 99385 PR PREVENTIVE VISIT,NEW,18-39: ICD-10-PCS | Mod: S$GLB,,, | Performed by: OBSTETRICS & GYNECOLOGY

## 2020-07-31 PROCEDURE — 99385 PREV VISIT NEW AGE 18-39: CPT | Mod: S$GLB,,, | Performed by: OBSTETRICS & GYNECOLOGY

## 2020-07-31 PROCEDURE — 88175 CYTOPATH C/V AUTO FLUID REDO: CPT

## 2020-07-31 PROCEDURE — 99999 PR PBB SHADOW E&M-NEW PATIENT-LVL III: ICD-10-PCS | Mod: PBBFAC,,, | Performed by: OBSTETRICS & GYNECOLOGY

## 2020-07-31 PROCEDURE — 99999 PR PBB SHADOW E&M-NEW PATIENT-LVL III: CPT | Mod: PBBFAC,,, | Performed by: OBSTETRICS & GYNECOLOGY

## 2020-07-31 RX ORDER — NORETHINDRONE ACETATE AND ETHINYL ESTRADIOL .02; 1 MG/1; MG/1
1 TABLET ORAL DAILY
Qty: 30 TABLET | Refills: 11 | Status: SHIPPED | OUTPATIENT
Start: 2020-07-31 | End: 2021-07-31

## 2020-07-31 NOTE — PROGRESS NOTES
"HPI:   40 y.o.   OB History        3    Para   3    Term   3            AB        Living   3       SAB        TAB        Ectopic        Multiple   0    Live Births   3              Patient's last menstrual period was 2020 (within days).    Patient is  here for her annual gynecologic exam.  She has no complaints.     ROS:  GENERAL: No fever, chills, fatigability or weight loss.  SKIN: No rashes, itching or changes in color or texture of skin.  HEAD: No headaches or recent head trauma.  EYES: Visual acuity fine. No photophobia, ocular pain or diplopia.  EARS: Denies ear pain, discharge or vertigo.  NOSE: No loss of smell, no epistaxis or postnasal drip.  MOUTH & THROAT: No hoarseness or change in voice. No excessive gum bleeding.  NODES: Denies swollen glands.  CHEST: Denies DASILVA, cyanosis, wheezing, cough and sputum production.  CARDIOVASCULAR: Denies chest pain, PND, orthopnea or reduced exercise tolerance.  ABDOMEN: Appetite fine. No weight loss. Denies diarrhea, abdominal pain, hematemesis or blood in stool.  URINARY: No flank pain, dysuria or hematuria.  PERIPHERAL VASCULAR: No claudication or cyanosis.  MUSCULOSKELETAL: No joint stiffness or swelling. Denies back pain.  NEUROLOGIC: No history of seizures, paralysis, alteration of gait or coordination.    PE:   /76   Ht 5' 2" (1.575 m)   Wt 51.6 kg (113 lb 13.9 oz)   LMP 2020 (Within Days)   BMI 20.83 kg/m²   APPEARANCE: Well nourished, well developed, in no acute distress.  NECK: Neck symmetric without masses or thyromegaly.  BREASTS: Symmetrical, no skin changes or visible lesions. No palpable masses, nipple discharge or adenopathy bilaterally.  ABDOMEN: Flat. Soft. No tenderness or masses. No hepatosplenomegaly. No hernias. No CVA tenderness.  VULVA: No lesions. Normal female genitalia.  URETHRAL MEATUS: Normal size and location, no lesions, no prolapse.  URETHRA: No masses, tenderness, prolapse or scarring.  VAGINA: Moist " and well rugated, no discharge, no significant cystocele or rectocele.  CERVIX: No lesions and discharge. PAP done.  UTERUS: Normal size, regular shape, mobile, non-tender, bladder base nontender.  ADNEXA: No masses, tenderness or CDS nodularity.  ANUS PERINEUM: Normal.    PROCEDURES:  Pap smear    Assessment:  Normal Gynecologic Exam    Plan:  Mammogram and Colonoscopy if indicated by current recommendations.  Return to clinic in one year or for any problems or complaints.  Ov today  Wasn't bc

## 2020-08-17 LAB
FINAL PATHOLOGIC DIAGNOSIS: NORMAL
Lab: NORMAL

## 2021-02-26 RX ORDER — FLUCONAZOLE 150 MG/1
150 TABLET ORAL DAILY
Qty: 1 TABLET | Refills: 0 | Status: SHIPPED | OUTPATIENT
Start: 2021-02-26 | End: 2021-02-27

## 2021-03-12 ENCOUNTER — IMMUNIZATION (OUTPATIENT)
Dept: PRIMARY CARE CLINIC | Facility: CLINIC | Age: 41
End: 2021-03-12

## 2021-03-12 DIAGNOSIS — Z23 NEED FOR VACCINATION: Primary | ICD-10-CM

## 2021-03-12 PROCEDURE — 0001A PR IMMUNIZ ADMIN, SARS-COV-2 COVID-19 VACC, 30MCG/0.3ML, 1ST DOSE: ICD-10-PCS | Mod: CV19,S$GLB,, | Performed by: INTERNAL MEDICINE

## 2021-03-12 PROCEDURE — 91300 PR SARS-COV- 2 COVID-19 VACCINE, NO PRSV, 30MCG/0.3ML, IM: ICD-10-PCS | Mod: S$GLB,,, | Performed by: INTERNAL MEDICINE

## 2021-03-12 PROCEDURE — 0001A PR IMMUNIZ ADMIN, SARS-COV-2 COVID-19 VACC, 30MCG/0.3ML, 1ST DOSE: CPT | Mod: CV19,S$GLB,, | Performed by: INTERNAL MEDICINE

## 2021-03-12 PROCEDURE — 91300 PR SARS-COV- 2 COVID-19 VACCINE, NO PRSV, 30MCG/0.3ML, IM: CPT | Mod: S$GLB,,, | Performed by: INTERNAL MEDICINE

## 2021-03-12 RX ADMIN — Medication 0.3 ML: at 04:03

## 2021-04-02 ENCOUNTER — IMMUNIZATION (OUTPATIENT)
Dept: PRIMARY CARE CLINIC | Facility: CLINIC | Age: 41
End: 2021-04-02
Payer: COMMERCIAL

## 2021-04-02 DIAGNOSIS — Z23 NEED FOR VACCINATION: Primary | ICD-10-CM

## 2021-04-02 PROCEDURE — 0002A PR IMMUNIZ ADMIN, SARS-COV-2 COVID-19 VACC, 30MCG/0.3ML, 2ND DOSE: CPT | Mod: CV19,S$GLB,, | Performed by: INTERNAL MEDICINE

## 2021-04-02 PROCEDURE — 91300 PR SARS-COV- 2 COVID-19 VACCINE, NO PRSV, 30MCG/0.3ML, IM: CPT | Mod: S$GLB,,, | Performed by: INTERNAL MEDICINE

## 2021-04-02 PROCEDURE — 91300 PR SARS-COV- 2 COVID-19 VACCINE, NO PRSV, 30MCG/0.3ML, IM: ICD-10-PCS | Mod: S$GLB,,, | Performed by: INTERNAL MEDICINE

## 2021-04-02 PROCEDURE — 0002A PR IMMUNIZ ADMIN, SARS-COV-2 COVID-19 VACC, 30MCG/0.3ML, 2ND DOSE: ICD-10-PCS | Mod: CV19,S$GLB,, | Performed by: INTERNAL MEDICINE

## 2021-04-02 RX ADMIN — Medication 0.3 ML: at 04:04

## 2021-04-19 ENCOUNTER — TELEPHONE (OUTPATIENT)
Dept: OBSTETRICS AND GYNECOLOGY | Facility: CLINIC | Age: 41
End: 2021-04-19

## 2021-04-19 DIAGNOSIS — Z12.31 VISIT FOR SCREENING MAMMOGRAM: Primary | ICD-10-CM

## 2021-05-05 ENCOUNTER — HOSPITAL ENCOUNTER (OUTPATIENT)
Dept: RADIOLOGY | Facility: HOSPITAL | Age: 41
Discharge: HOME | End: 2021-05-05
Attending: OBSTETRICS & GYNECOLOGY
Payer: COMMERCIAL

## 2021-05-05 DIAGNOSIS — Z12.31 VISIT FOR SCREENING MAMMOGRAM: ICD-10-CM

## 2021-05-05 PROCEDURE — 77067 SCR MAMMO BI INCL CAD: CPT | Mod: TC,PO

## 2023-11-25 NOTE — H&P
38 y/o  at 38-6 for rpt secton, us done in office showed early grade 3 placneta and mild oligohydramnios. Pt has uncomplicated prenatal care.  pmh neg  psh neg  Allergies none  Sh neg  pe vss  Head/neck nromal  abd gravid  extr normal  Ass;' as above  Plan, rpt section today   82